# Patient Record
Sex: MALE | Race: WHITE | NOT HISPANIC OR LATINO | URBAN - METROPOLITAN AREA
[De-identification: names, ages, dates, MRNs, and addresses within clinical notes are randomized per-mention and may not be internally consistent; named-entity substitution may affect disease eponyms.]

---

## 2017-10-30 ENCOUNTER — OFFICE VISIT - RIVER FALLS (OUTPATIENT)
Dept: FAMILY MEDICINE | Facility: CLINIC | Age: 22
End: 2017-10-30

## 2017-10-30 ASSESSMENT — MIFFLIN-ST. JEOR: SCORE: 2226.38

## 2017-11-01 ENCOUNTER — AMBULATORY - RIVER FALLS (OUTPATIENT)
Dept: FAMILY MEDICINE | Facility: CLINIC | Age: 22
End: 2017-11-01

## 2022-02-12 VITALS
DIASTOLIC BLOOD PRESSURE: 70 MMHG | SYSTOLIC BLOOD PRESSURE: 120 MMHG | HEIGHT: 78 IN | HEART RATE: 64 BPM | BODY MASS INDEX: 27.58 KG/M2 | WEIGHT: 238.4 LBS | TEMPERATURE: 98.6 F

## 2022-02-16 NOTE — PROGRESS NOTES
Patient:   VA CARRASQUILLO            MRN: [id number]            FIN: [id number]               Age:   22 years     Sex:  Male     :  1995   Associated Diagnoses:   Pre-employment examination   Author:   Robert Blackman PA-C      Report Summary   Diagnosis  Pre-employment examination ([id number] Z02.1).     Visit Information      Date of Service: 10/30/2017 02:53 pm  Performing Location: [facility]  Encounter#: [id number]      Primary Care Provider (PCP):  NONE ,       Referring Provider:  Robert Blackman PA-C    NPI# [id number]      Chief Complaint   10/30/2017 3:10 PM CDT   Pre-employment exam        Well Adult History   Well Adult History             The patient presents for well adult exam.  The patient's general health status is described as good.  Here for pre-placement exam.  Health history was reviewed. See form in chart..        Review of Systems   Constitutional:  Negative.    Eye:  Negative.    Ear/Nose/Mouth/Throat:  Negative.    Respiratory:  Negative.    Cardiovascular:  Negative.    Gastrointestinal:  Negative.    Genitourinary:  Negative.    Hematology/Lymphatics:  Negative.    Endocrine:  Negative.    Immunologic:  Negative.    Musculoskeletal:  Negative.    Integumentary:  Negative.    Neurologic:  Negative.    Psychiatric:  Negative.    All other systems reviewed and negative      Health Status   Allergies:    Allergic Reactions (All)  No Known Medication Allergies   Medications:  (Selected)      Problem list:    No problem items selected or recorded.      Histories   Past Medical History:    No active or resolved past medical history items have been selected or recorded.   Family History:    No family history items have been selected or recorded.   Procedure history:    No active procedure history items have been selected or recorded.   Social History:             No active social history items have been recorded.      Physical Examination   Vital Signs   10/30/2017 3:10 PM CDT  Temperature Tympanic 98.6 DegF    Peripheral Pulse Rate 64 bpm    Pulse Site Radial artery    HR Method Manual    Systolic Blood Pressure 120 mmHg    Diastolic Blood Pressure 70 mmHg    Mean Arterial Pressure 87 mmHg    BP Site Left arm    BP Method Manual      Measurements from flowsheet : Measurements   10/30/2017 3:10 PM CDT Height Measured - Standard 80 in    Weight Measured - Standard 238.4 lb    BSA 2.47 m2    Body Mass Index 26.19 kg/m2      General:  Alert and oriented, No acute distress.    Eye:  Pupils are equal, round and reactive to light, Extraocular movements are intact, Normal conjunctiva.    HENT:  Normocephalic, Tympanic membranes are clear, Oral mucosa is moist, No pharyngeal erythema.    Neck:  Supple, Non-tender, No lymphadenopathy.    Respiratory:  Lungs are clear to auscultation, Respirations are non-labored, Breath sounds are equal.    Cardiovascular:  Normal rate, Regular rhythm, No murmur.    Gastrointestinal:  Soft, Non-tender, Non-distended, Normal bowel sounds, No organomegaly.    Genitourinary:  No costovertebral angle tenderness.    Musculoskeletal:  Normal range of motion, Normal strength, No swelling, No deformity, Normal gait.    Integumentary:  No rash.    Neurologic:  No focal deficits.    Psychiatric:  Cooperative, Appropriate mood & affect.       Health Maintenance      Recommendations     Pending (in the next year)        Due            Alcohol Misuse Screen (Male) due  10/30/17  and every 1  year(s)           Depression Screen (Male) due  10/30/17  and every 1  year(s)           HIV Screen (if sexually active) (Male) due  10/30/17  and every 1  year(s)           STD Counseling (if sexually active) (Male) due  10/30/17  and every 1  year(s)           Syphilis Screen (if sexually active) (Male) due  10/30/17  and every 1  year(s)           Tetanus Vaccine due  10/30/17  and every 10  year(s)     Satisfied (in the past 1 year)        Satisfied            Body Mass Index Check  (Male) on  10/30/17.           High Blood Pressure Screen (Male) on  10/30/17.           Tobacco Use Screen (Male) on  10/30/17.        Impression and Plan   Diagnosis     Pre-employment examination ([id number] Z02.1).     Cleared without restriction pending negative Mantoux.